# Patient Record
Sex: FEMALE | Race: WHITE | Employment: STUDENT | URBAN - METROPOLITAN AREA
[De-identification: names, ages, dates, MRNs, and addresses within clinical notes are randomized per-mention and may not be internally consistent; named-entity substitution may affect disease eponyms.]

---
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
## 2020-07-10 ENCOUNTER — APPOINTMENT (OUTPATIENT)
Dept: GENERAL RADIOLOGY | Facility: HOSPITAL | Age: 20
End: 2020-07-10
Payer: COMMERCIAL

## 2020-07-10 ENCOUNTER — HOSPITAL ENCOUNTER (EMERGENCY)
Facility: HOSPITAL | Age: 20
Discharge: LEFT BEFORE DISCHARGE | End: 2020-07-10
Payer: COMMERCIAL

## 2020-07-10 ENCOUNTER — APPOINTMENT (OUTPATIENT)
Dept: CT IMAGING | Facility: HOSPITAL | Age: 20
End: 2020-07-10
Attending: EMERGENCY MEDICINE
Payer: COMMERCIAL

## 2020-07-10 VITALS
WEIGHT: 123.44 LBS | OXYGEN SATURATION: 98 % | HEART RATE: 86 BPM | DIASTOLIC BLOOD PRESSURE: 68 MMHG | BODY MASS INDEX: 21 KG/M2 | SYSTOLIC BLOOD PRESSURE: 111 MMHG | RESPIRATION RATE: 18 BRPM | TEMPERATURE: 98 F

## 2020-07-10 DIAGNOSIS — N83.201 CYST OF RIGHT OVARY: Primary | ICD-10-CM

## 2020-07-10 DIAGNOSIS — R10.9 ABDOMINAL PAIN OF UNKNOWN ETIOLOGY: ICD-10-CM

## 2020-07-10 LAB
ALBUMIN SERPL-MCNC: 4.1 G/DL (ref 3.4–5)
ALBUMIN/GLOB SERPL: 0.9 {RATIO} (ref 1–2)
ALP LIVER SERPL-CCNC: 98 U/L (ref 52–144)
ALT SERPL-CCNC: 18 U/L (ref 13–56)
ANION GAP SERPL CALC-SCNC: 4 MMOL/L (ref 0–18)
AST SERPL-CCNC: 15 U/L (ref 15–37)
BASOPHILS # BLD AUTO: 0.04 X10(3) UL (ref 0–0.2)
BASOPHILS NFR BLD AUTO: 0.4 %
BILIRUB SERPL-MCNC: 0.6 MG/DL (ref 0.1–2)
BUN BLD-MCNC: 9 MG/DL (ref 7–18)
BUN/CREAT SERPL: 10.3 (ref 10–20)
CALCIUM BLD-MCNC: 9.3 MG/DL (ref 8.5–10.1)
CHLORIDE SERPL-SCNC: 105 MMOL/L (ref 98–112)
CO2 SERPL-SCNC: 29 MMOL/L (ref 21–32)
CREAT BLD-MCNC: 0.87 MG/DL (ref 0.55–1.02)
DEPRECATED RDW RBC AUTO: 47.4 FL (ref 35.1–46.3)
EOSINOPHIL # BLD AUTO: 0.15 X10(3) UL (ref 0–0.7)
EOSINOPHIL NFR BLD AUTO: 1.5 %
ERYTHROCYTE [DISTWIDTH] IN BLOOD BY AUTOMATED COUNT: 13.9 % (ref 11–15)
GLOBULIN PLAS-MCNC: 4.5 G/DL (ref 2.8–4.4)
GLUCOSE BLD-MCNC: 71 MG/DL (ref 70–99)
HCT VFR BLD AUTO: 43.3 % (ref 35–48)
HGB BLD-MCNC: 14.5 G/DL (ref 12–16)
IMM GRANULOCYTES # BLD AUTO: 0.03 X10(3) UL (ref 0–1)
IMM GRANULOCYTES NFR BLD: 0.3 %
LIPASE SERPL-CCNC: 93 U/L (ref 73–393)
LYMPHOCYTES # BLD AUTO: 2.21 X10(3) UL (ref 1–4)
LYMPHOCYTES NFR BLD AUTO: 22.7 %
M PROTEIN MFR SERPL ELPH: 8.6 G/DL (ref 6.4–8.2)
MCH RBC QN AUTO: 31.3 PG (ref 26–34)
MCHC RBC AUTO-ENTMCNC: 33.5 G/DL (ref 31–37)
MCV RBC AUTO: 93.5 FL (ref 80–100)
MONOCYTES # BLD AUTO: 0.93 X10(3) UL (ref 0.1–1)
MONOCYTES NFR BLD AUTO: 9.6 %
NEUTROPHILS # BLD AUTO: 6.37 X10 (3) UL (ref 1.5–7.7)
NEUTROPHILS # BLD AUTO: 6.37 X10(3) UL (ref 1.5–7.7)
NEUTROPHILS NFR BLD AUTO: 65.5 %
OSMOLALITY SERPL CALC.SUM OF ELEC: 283 MOSM/KG (ref 275–295)
PLATELET # BLD AUTO: 309 10(3)UL (ref 150–450)
POTASSIUM SERPL-SCNC: 4.2 MMOL/L (ref 3.5–5.1)
RBC # BLD AUTO: 4.63 X10(6)UL (ref 3.8–5.3)
SODIUM SERPL-SCNC: 138 MMOL/L (ref 136–145)
WBC # BLD AUTO: 9.7 X10(3) UL (ref 4–11)

## 2020-07-10 PROCEDURE — 99284 EMERGENCY DEPT VISIT MOD MDM: CPT

## 2020-07-10 PROCEDURE — 36415 COLL VENOUS BLD VENIPUNCTURE: CPT

## 2020-07-10 PROCEDURE — 73030 X-RAY EXAM OF SHOULDER: CPT

## 2020-07-10 PROCEDURE — 85025 COMPLETE CBC W/AUTO DIFF WBC: CPT | Performed by: EMERGENCY MEDICINE

## 2020-07-10 PROCEDURE — 80053 COMPREHEN METABOLIC PANEL: CPT | Performed by: EMERGENCY MEDICINE

## 2020-07-10 PROCEDURE — 71046 X-RAY EXAM CHEST 2 VIEWS: CPT

## 2020-07-10 PROCEDURE — 83690 ASSAY OF LIPASE: CPT | Performed by: EMERGENCY MEDICINE

## 2020-07-10 PROCEDURE — 74177 CT ABD & PELVIS W/CONTRAST: CPT | Performed by: EMERGENCY MEDICINE

## 2020-07-10 NOTE — ED NOTES
Pt refusing ultrasound and states she is unable to provide a urine sample. Dr. Melida Garcia notified.

## 2020-07-10 NOTE — ED INITIAL ASSESSMENT (HPI)
C/o right shoulder pain since yesterday. Pt states she also noticed \"lumps\" under her right breast \"earlier this week\".  Pt states unknown falls or trauma but says she also noticed a scab on her right elbow and is \"unsure how that got there but maybe i

## 2020-07-12 NOTE — ED PROVIDER NOTES
Patient Seen in: [facility] AND [facility] Emergency Department      History   Patient presents with:  Upper Extremity Injury  Abdomen/Flank Pain    Stated Complaint: Rt arm pain    HPI    21year old female with h/o allergies, h/o self mutilation cutting to all Pulse 110   Resp 20   Temp 98.2 °F (36.8 °C)   Temp src    SpO2 98 %   O2 Device None (Room air)       Current:/68   Pulse 86   Temp 98.2 °F (36.8 °C)   Resp 18   Wt 56 kg   LMP  (Within Weeks)   SpO2 98%   BMI 20.54 kg/m²         Physical Exam  Yoly Total Protein 8.6 (*)     Globulin  4.5 (*)     A/G Ratio 0.9 (*)     All other components within normal limits   CBC W/ DIFFERENTIAL - Abnormal; Notable for the following components:    RDW-SD 47.4 (*)     All other components within normal limits   LIPA CONCLUSION:  1. Development of a mildly complex 4.8 cm right adnexal cyst, which most likely represents a physiologic right ovarian cyst.  If the patient demonstrates symptoms referable to the right adnexa, dedicated pelvic ultrasound could be considered. Discharge Medication List as of 7/10/2020  5:09 PM

## 2020-08-03 ENCOUNTER — TELEPHONE (OUTPATIENT)
Dept: PEDIATRICS CLINIC | Facility: CLINIC | Age: 20
End: 2020-08-03

## 2020-08-03 NOTE — TELEPHONE ENCOUNTER
Paperwork received for Donation of Plasmapheresis Program. Adilene Batres unable to complete as last [facility] was in 2017 and patient aged out of Pediatrics. Patient to call back for PCP recommendations if needed.

## 2020-08-27 ENCOUNTER — HOSPITAL ENCOUNTER (EMERGENCY)
Facility: HOSPITAL | Age: 20
Discharge: HOME | End: 2020-08-27
Attending: EMERGENCY MEDICINE
Payer: COMMERCIAL

## 2020-08-27 VITALS
WEIGHT: 130 LBS | HEIGHT: 65 IN | TEMPERATURE: 100 F | RESPIRATION RATE: 20 BRPM | HEART RATE: 90 BPM | DIASTOLIC BLOOD PRESSURE: 82 MMHG | SYSTOLIC BLOOD PRESSURE: 132 MMHG | OXYGEN SATURATION: 96 % | BODY MASS INDEX: 21.66 KG/M2

## 2020-08-27 DIAGNOSIS — B00.9 HSV-2 (HERPES SIMPLEX VIRUS 2) INFECTION: Primary | ICD-10-CM

## 2020-08-27 PROCEDURE — 99284 EMERGENCY DEPT VISIT MOD MDM: CPT

## 2020-08-27 PROCEDURE — 96372 THER/PROPH/DIAG INJ SC/IM: CPT

## 2020-08-27 PROCEDURE — 87591 N.GONORRHOEAE DNA AMP PROB: CPT | Performed by: EMERGENCY MEDICINE

## 2020-08-27 PROCEDURE — 87205 SMEAR GRAM STAIN: CPT | Performed by: EMERGENCY MEDICINE

## 2020-08-27 PROCEDURE — 87491 CHLMYD TRACH DNA AMP PROBE: CPT | Performed by: EMERGENCY MEDICINE

## 2020-08-27 PROCEDURE — 87106 FUNGI IDENTIFICATION YEAST: CPT | Performed by: EMERGENCY MEDICINE

## 2020-08-27 PROCEDURE — 87808 TRICHOMONAS ASSAY W/OPTIC: CPT | Performed by: EMERGENCY MEDICINE

## 2020-08-27 RX ORDER — IBUPROFEN 600 MG/1
600 TABLET ORAL ONCE
Status: COMPLETED | OUTPATIENT
Start: 2020-08-27 | End: 2020-08-27

## 2020-08-27 RX ORDER — AZITHROMYCIN 250 MG/1
1000 TABLET, FILM COATED ORAL ONCE
Status: COMPLETED | OUTPATIENT
Start: 2020-08-27 | End: 2020-08-27

## 2020-08-27 RX ORDER — ACETAMINOPHEN 500 MG
1000 TABLET ORAL ONCE
Status: COMPLETED | OUTPATIENT
Start: 2020-08-27 | End: 2020-08-27

## 2020-08-27 RX ORDER — VALACYCLOVIR HYDROCHLORIDE 1 G/1
1 TABLET, FILM COATED ORAL EVERY 12 HOURS
Qty: 20 TABLET | Refills: 0 | Status: SHIPPED | OUTPATIENT
Start: 2020-08-27 | End: 2020-09-06

## 2020-08-28 LAB
C TRACH DNA SPEC QL NAA+PROBE: POSITIVE
N GONORRHOEA DNA SPEC QL NAA+PROBE: POSITIVE
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
## 2020-08-30 LAB
GENITAL VAGINOSIS SCREEN: POSITIVE
TRICHOMONAS SCREEN: NEGATIVE

## 2021-09-23 ENCOUNTER — APPOINTMENT (OUTPATIENT)
Dept: GENERAL RADIOLOGY | Facility: HOSPITAL | Age: 21
End: 2021-09-23
Attending: EMERGENCY MEDICINE
Payer: COMMERCIAL

## 2021-09-23 PROCEDURE — 73130 X-RAY EXAM OF HAND: CPT | Performed by: EMERGENCY MEDICINE

## 2021-09-23 NOTE — ED QUICK NOTES
Dong Daigle notified of resources needed for patient for outpatient detox. List of resources obtained to go over with patient.

## 2021-09-23 NOTE — ED QUICK NOTES
Pt A&OX4, pt's mother in [place]. Discharge paperwork, prescription, follow-up and resources thoroughly with patient, patient verbally understands them. Pt discharged ambulatory with steady gait - no distress noted.

## 2021-09-23 NOTE — ED INITIAL ASSESSMENT (HPI)
Requesting detox for heroin. Reports last using 1 day ago. Reports using x3 months, +snorting and injecting. Patient also concerned for UTI and herpes.

## 2021-09-23 NOTE — ED PROVIDER NOTES
Patient Seen in: [facility] AND [facility] Emergency Department      History   Patient presents with:  Eval-D    Stated Complaint: eval D; heroin last used yesterday    Subjective:   HPI    24year old female who has a history of self-mutilation, headaches, IV h 23.30 kg/m²         Physical Exam  Vitals and nursing note reviewed. Constitutional:       General: She is not in acute distress. Appearance: She is well-developed. She is not toxic-appearing or diaphoretic.       Comments: Appears to feel unwell, c/o WBC Urine 11-20 (*)     RBC Urine >10 (*)     Bacteria Urine 1+ (*)     Squamous Epi.  Cells Few (*)     All other components within normal limits   URINE CULTURE, ROUTINE       MDM      Pulse Ox: 100%, Normal, RA    Cardiac Monitor: Pulse Readings from [employer] follow-up          Medications Prescribed:  Current Discharge Medication List    START taking these medications    buprenorphine HCl 2 MG Sublingual SL Tab  Place 2 tablets (4 mg total) under the tongue daily for 3 days.   Qty: 6 tablet Refills: 0    cephal

## 2021-10-20 ENCOUNTER — HOSPITAL ENCOUNTER (EMERGENCY)
Facility: HOSPITAL | Age: 21
Discharge: HOME | End: 2021-10-20
Attending: EMERGENCY MEDICINE
Payer: COMMERCIAL

## 2021-10-20 ENCOUNTER — APPOINTMENT (OUTPATIENT)
Dept: ULTRASOUND IMAGING | Facility: HOSPITAL | Age: 21
End: 2021-10-20
Attending: EMERGENCY MEDICINE
Payer: COMMERCIAL

## 2021-10-20 VITALS
HEART RATE: 74 BPM | DIASTOLIC BLOOD PRESSURE: 70 MMHG | HEIGHT: 65 IN | OXYGEN SATURATION: 100 % | BODY MASS INDEX: 21.66 KG/M2 | SYSTOLIC BLOOD PRESSURE: 124 MMHG | RESPIRATION RATE: 18 BRPM | TEMPERATURE: 98 F | WEIGHT: 130 LBS

## 2021-10-20 DIAGNOSIS — R82.71 BACTERIURIA WITH PYURIA: ICD-10-CM

## 2021-10-20 DIAGNOSIS — Z87.898 HISTORY OF HEROIN USE: Primary | ICD-10-CM

## 2021-10-20 DIAGNOSIS — R19.7 NAUSEA VOMITING AND DIARRHEA: ICD-10-CM

## 2021-10-20 DIAGNOSIS — R10.84 GENERALIZED ABDOMINAL CRAMPING: ICD-10-CM

## 2021-10-20 DIAGNOSIS — R11.2 NAUSEA VOMITING AND DIARRHEA: ICD-10-CM

## 2021-10-20 DIAGNOSIS — R82.81 BACTERIURIA WITH PYURIA: ICD-10-CM

## 2021-10-20 PROCEDURE — 96361 HYDRATE IV INFUSION ADD-ON: CPT

## 2021-10-20 PROCEDURE — 99285 EMERGENCY DEPT VISIT HI MDM: CPT

## 2021-10-20 PROCEDURE — 96372 THER/PROPH/DIAG INJ SC/IM: CPT

## 2021-10-20 PROCEDURE — 83690 ASSAY OF LIPASE: CPT | Performed by: EMERGENCY MEDICINE

## 2021-10-20 PROCEDURE — 80048 BASIC METABOLIC PNL TOTAL CA: CPT | Performed by: EMERGENCY MEDICINE

## 2021-10-20 PROCEDURE — 96375 TX/PRO/DX INJ NEW DRUG ADDON: CPT

## 2021-10-20 PROCEDURE — 81025 URINE PREGNANCY TEST: CPT

## 2021-10-20 PROCEDURE — 81001 URINALYSIS AUTO W/SCOPE: CPT | Performed by: EMERGENCY MEDICINE

## 2021-10-20 PROCEDURE — 80048 BASIC METABOLIC PNL TOTAL CA: CPT

## 2021-10-20 PROCEDURE — 76705 ECHO EXAM OF ABDOMEN: CPT | Performed by: EMERGENCY MEDICINE

## 2021-10-20 PROCEDURE — 85025 COMPLETE CBC W/AUTO DIFF WBC: CPT

## 2021-10-20 PROCEDURE — 96374 THER/PROPH/DIAG INJ IV PUSH: CPT

## 2021-10-20 PROCEDURE — 87086 URINE CULTURE/COLONY COUNT: CPT | Performed by: EMERGENCY MEDICINE

## 2021-10-20 PROCEDURE — 80076 HEPATIC FUNCTION PANEL: CPT | Performed by: EMERGENCY MEDICINE

## 2021-10-20 PROCEDURE — 85025 COMPLETE CBC W/AUTO DIFF WBC: CPT | Performed by: EMERGENCY MEDICINE

## 2021-10-20 PROCEDURE — 84703 CHORIONIC GONADOTROPIN ASSAY: CPT | Performed by: EMERGENCY MEDICINE

## 2021-10-20 RX ORDER — DICYCLOMINE HYDROCHLORIDE 10 MG/ML
20 INJECTION INTRAMUSCULAR ONCE
Status: COMPLETED | OUTPATIENT
Start: 2021-10-20 | End: 2021-10-20

## 2021-10-20 RX ORDER — METOCLOPRAMIDE HYDROCHLORIDE 5 MG/ML
10 INJECTION INTRAMUSCULAR; INTRAVENOUS ONCE
Status: COMPLETED | OUTPATIENT
Start: 2021-10-20 | End: 2021-10-20

## 2021-10-20 RX ORDER — ONDANSETRON 4 MG/1
4 TABLET, ORALLY DISINTEGRATING ORAL EVERY 8 HOURS PRN
Qty: 15 TABLET | Refills: 0 | Status: SHIPPED | OUTPATIENT
Start: 2021-10-20 | End: 2021-10-25

## 2021-10-20 RX ORDER — NITROFURANTOIN 25; 75 MG/1; MG/1
100 CAPSULE ORAL 2 TIMES DAILY
Qty: 10 CAPSULE | Refills: 0 | Status: SHIPPED | OUTPATIENT
Start: 2021-10-20 | End: 2021-10-25

## 2021-10-20 RX ORDER — DICYCLOMINE HCL 20 MG
20 TABLET ORAL 4 TIMES DAILY PRN
Qty: 40 TABLET | Refills: 0 | Status: SHIPPED | OUTPATIENT
Start: 2021-10-20 | End: 2021-10-30

## 2021-10-20 RX ORDER — ONDANSETRON 2 MG/ML
4 INJECTION INTRAMUSCULAR; INTRAVENOUS ONCE
Status: COMPLETED | OUTPATIENT
Start: 2021-10-20 | End: 2021-10-20

## 2021-10-20 RX ORDER — METOCLOPRAMIDE 10 MG/1
10 TABLET ORAL EVERY 6 HOURS PRN
Qty: 20 TABLET | Refills: 0 | Status: SHIPPED | OUTPATIENT
Start: 2021-10-20 | End: 2021-10-25

## 2021-10-20 RX ORDER — DIPHENHYDRAMINE HYDROCHLORIDE 50 MG/ML
25 INJECTION INTRAMUSCULAR; INTRAVENOUS ONCE
Status: COMPLETED | OUTPATIENT
Start: 2021-10-20 | End: 2021-10-20

## 2021-10-20 NOTE — ED PROVIDER NOTES
Patient Seen in: [facility] AND [facility] Emergency Department    History   Patient presents with:  Abdomen/Flank Pain  Nausea/Vomiting/Diarrhea  Fever    Stated Complaint: vomiting/fever      HPI    63-year-old female without past medical history aside from IV h Positive for stated complaint: vomiting/fever   Other systems are as noted in HPI. Constitutional and vital signs reviewed. All other systems reviewed and negative except as noted above.     PSFH elements reviewed from today and agreed except as o Differential With Platelet.   Procedure                               Abnormality         Status                     ---------                               -----------         ------                     CBC W/ DIFFERENTIAL[[id number]] given prior cholecystectomy.     DICTATED BY (CST): Janet Sanchez MD ON 10/20/2021 AT 9:00 PM     FINALIZED BY (CST): Janet Sanchez MD ON 10/20/2021 AT 9:01 PM                    MDM     DIFFERENTIAL DIAGNOSIS: After history and physical exam differ Medication List as of 10/20/2021 10:50 PM    START taking these medications    dicyclomine 20 MG Oral Tab  Take 1 tablet (20 mg total) by mouth 4 (four) times daily as needed (For abdominal pain/cramping and/or diarrhea. )., Print, Disp-40 tablet, R-0    me

## 2021-10-20 NOTE — ED INITIAL ASSESSMENT (HPI)
Pt c/o cramping lower abd pain, fever, vomiting for the past few days. Pt also c/o heroin withdrawal last used 2 days ago.
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
## 2021-11-08 PROBLEM — F11.20 OPIOID USE DISORDER, SEVERE, DEPENDENCE (HCC): Status: ACTIVE | Noted: 2021-11-08

## 2021-11-08 NOTE — ED QUICK NOTES
ROUNDING COMPLETED  PAIN: PATIENT DENIES PAIN AT THIS TIME  WAITING: DETOX PLACEMENT  ELIMINATION: BRP OFFERED  NEEDS: NO ADDITIONAL NEEDS AT THIS TIME    BED LOCKED AND IN LOWEST POSITION. SITTER IN LINE OF SIGHT. WILL CONTINUE TO MONITOR.  WILL UPDATE P

## 2021-11-08 NOTE — PROGRESS NOTES
11/08/21 0910   COVID Exposure Risk Screening   Have you been practicing social distancing? Yes   Have you been wearing a mask when in the community?  No   Describe pt does not generally go out into the community, but is not wearing a mask (only leaving

## 2021-11-08 NOTE — ED INITIAL ASSESSMENT (HPI)
Patient denies suicidal ideation/plan but reports \"not minding if she \" because of the severe generalized withdrawal pain

## 2021-11-08 NOTE — ED PROVIDER NOTES
Patient Seen in: [facility] AND [facility] Emergency Department    History   Patient presents with:  [place]    Stated Complaint:     HPI    Patient presents to the emergency department again because of her heroin abuse history.   She states she both snorts and in External Gel,  Use qhs for acne back chest face         Review of Systems  Constitutional: no fever    Positive for stated complaint:   Other systems are as noted in HPI. Constitutional and vital signs reviewed.       All other systems reviewed and negativ this point. With the female nurse in the room with me I did examine the external area there is a partially healing lesion on the right lower labia on the right there is no vesicle there is no surrounding infection noted.   I discussed with her at this poin assay on the [facility], [facility], [address] [place] [address]. This test is being used under the Food and Drug Administration's Emergency Use Authorization.     The authorized Fact Sheet for Healthcare Providers for this assay is available upon request

## 2021-11-08 NOTE — ED QUICK NOTES
Ed crisis worker at bedside to evaluate patient, patient found to be vomiting.  Will medicate per mar
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
## 2021-11-08 NOTE — ED INITIAL ASSESSMENT (HPI)
Patient arrives through triage for heroin withdrawal.  +decreased appetite.  +constipation.  +generalized body aches. +abdominal cramping. Patient reports using \"bumps\" of heroin to decrease withdrawal symptoms yesterday.

## 2021-11-08 NOTE — ED QUICK NOTES
rn rounded on patent. Patient denies pain at this time.   [place] provided per patient request  Food offered to patient who declined at this time, rn informed patient to let rn know if patient wants food  Awaiting detox placement  Will continue to monitor

## 2021-11-08 NOTE — CONSULTS
[facility] HOSP - [facility]    Report of Consultation    Elena Crabtree Patient Status:  Emergency    2000 MRN [id number]   Location [address] Attending Vivien Cisneros MD   Hosp Day # 0 PCP Sreedhar Brock MD and mother to pay for substance use. She states that her mother did not know the money was being used for this purpose. Patient states that she feels paranoia and hears voices all the time.  She says that the voices are exacerbated whenever she uses meth rhinitis    • HEADACHES    • Heroin abuse ([facility] [address]    • History of self mutilation     Cutting, per pt (between 12-14 yrs of age)   • Multiple allergies        Past Surgical History  Past Surgical History:   Procedure Laterality Date   • LAPAROSCOPIC CHOLECYS contact. Psychomotor: constant leg bouncing  Orientation: Alert and oriented x 3  Gait: not assessed  Attitude/Cooperation: Guarded at first, opened up later. Cooperative with questions.   Behavior: Poor eye contact, flattened affect, occasionally tearful patch 0.3 for 1 week. 5.  Utilize Haldol 2 mg orally twice daily to address her hallucination, nausea and intrusive thoughts. 6.  Utilize Ativan 1 mg orally every 6 hours as needed for anxiety but  7.   Discussed the possible admission to [facility] p

## 2021-11-08 NOTE — ED QUICK NOTES
Crisis worker informed rn needing nurse to nurse, called x [phone], reported will call this rn back

## 2022-01-16 ENCOUNTER — HOSPITAL ENCOUNTER (OUTPATIENT)
Age: 22
Discharge: HOME | End: 2022-01-16
Payer: COMMERCIAL

## 2022-01-16 VITALS
BODY MASS INDEX: 21.66 KG/M2 | HEART RATE: 112 BPM | HEIGHT: 65 IN | DIASTOLIC BLOOD PRESSURE: 73 MMHG | TEMPERATURE: 100 F | OXYGEN SATURATION: 97 % | RESPIRATION RATE: 20 BRPM | SYSTOLIC BLOOD PRESSURE: 112 MMHG | WEIGHT: 130 LBS

## 2022-01-16 DIAGNOSIS — R50.9 FEVER, UNSPECIFIED FEVER CAUSE: Primary | ICD-10-CM

## 2022-01-16 DIAGNOSIS — B34.9 VIRAL ILLNESS: ICD-10-CM

## 2022-01-16 PROCEDURE — 99213 OFFICE O/P EST LOW 20 MIN: CPT | Performed by: NURSE PRACTITIONER

## 2022-01-16 PROCEDURE — A9150 MISC/EXPER NON-PRESCRIPT DRU: HCPCS | Performed by: NURSE PRACTITIONER

## 2022-01-16 RX ORDER — ACETAMINOPHEN 500 MG
1000 TABLET ORAL EVERY 4 HOURS PRN
Qty: 100 TABLET | Refills: 0 | Status: SHIPPED | OUTPATIENT
Start: 2022-01-16 | End: 2022-01-23

## 2022-01-16 RX ORDER — IBUPROFEN 400 MG/1
800 TABLET ORAL ONCE
Status: COMPLETED | OUTPATIENT
Start: 2022-01-16 | End: 2022-01-16

## 2022-01-16 RX ORDER — VALACYCLOVIR HYDROCHLORIDE 500 MG/1
1000 TABLET, FILM COATED ORAL DAILY
Qty: 10 TABLET | Refills: 0 | Status: SHIPPED | OUTPATIENT
Start: 2022-01-16 | End: 2022-01-21

## 2022-01-16 RX ORDER — IBUPROFEN 600 MG/1
600 TABLET ORAL EVERY 8 HOURS PRN
Qty: 30 TABLET | Refills: 0 | Status: SHIPPED | OUTPATIENT
Start: 2022-01-16 | End: 2022-01-23

## 2022-01-16 RX ORDER — ACETAMINOPHEN 500 MG
1000 TABLET ORAL ONCE
Status: COMPLETED | OUTPATIENT
Start: 2022-01-16 | End: 2022-01-16

## 2022-01-16 NOTE — ED PROVIDER NOTES
Patient Seen in: [facility]      History   Patient presents with: Body ache and/or chills    Stated Complaint: chest pain, sob    Subjective:   HPI    1 week of chest and body pain with fever and cough.   Completely reproducible pain with pal All other systems reviewed and are negative. Positive for stated complaint: chest pain, sob  Other systems are as noted in HPI. Constitutional and vital signs reviewed. All other systems reviewed and negative except as noted above.     Physical Pulse: 112   Resp: 20   Temp: 99.5 °F (37.5 °C)          01/16/22  1310   BP:    Pulse: 112   Resp: 20   Temp: 99.5 °F (37.5 °C)     Pt feels improved  covid test pending     Serial exams performed, no new or worsening concerns, in fact the pt feels impr in medical language and may contain abbreviations or verbiage that are unfamiliar. It may appear blunt or direct.   Medical documents are intended to carry relevant information, facts as evident, and the clinical opinion of the practitioner\"

## 2022-01-16 NOTE — ED INITIAL ASSESSMENT (HPI)
Pt came in due to body aches and fever for the past week. Pt stated she has an outbreak of herpes at the moment and would like a prescription. Pt has easy non labored respirations. Pt is fully verbal and ambulatory.

## 2022-01-17 ENCOUNTER — APPOINTMENT (OUTPATIENT)
Dept: GENERAL RADIOLOGY | Facility: HOSPITAL | Age: 22
End: 2022-01-17
Attending: EMERGENCY MEDICINE
Payer: COMMERCIAL

## 2022-01-17 ENCOUNTER — HOSPITAL ENCOUNTER (EMERGENCY)
Facility: HOSPITAL | Age: 22
Discharge: HOME | End: 2022-01-18
Attending: EMERGENCY MEDICINE
Payer: COMMERCIAL

## 2022-01-17 DIAGNOSIS — Z20.822 SUSPECTED COVID-19 VIRUS INFECTION: ICD-10-CM

## 2022-01-17 DIAGNOSIS — R11.2 NAUSEA AND VOMITING IN ADULT: ICD-10-CM

## 2022-01-17 DIAGNOSIS — R07.89 CHEST PAIN, ATYPICAL: Primary | ICD-10-CM

## 2022-01-17 LAB
ANION GAP SERPL CALC-SCNC: 7 MMOL/L (ref 0–18)
BASOPHILS # BLD AUTO: 0.06 X10(3) UL (ref 0–0.2)
BASOPHILS NFR BLD AUTO: 0.4 %
BUN BLD-MCNC: 7 MG/DL (ref 7–18)
BUN/CREAT SERPL: 13 (ref 10–20)
CALCIUM BLD-MCNC: 9.7 MG/DL (ref 8.5–10.1)
CHLORIDE SERPL-SCNC: 102 MMOL/L (ref 98–112)
CO2 SERPL-SCNC: 24 MMOL/L (ref 21–32)
CREAT BLD-MCNC: 0.54 MG/DL
D DIMER PPP FEU-MCNC: 2.53 UG/ML FEU (ref ?–0.5)
DEPRECATED RDW RBC AUTO: 43.1 FL (ref 35.1–46.3)
EOSINOPHIL # BLD AUTO: 0.04 X10(3) UL (ref 0–0.7)
EOSINOPHIL NFR BLD AUTO: 0.3 %
ERYTHROCYTE [DISTWIDTH] IN BLOOD BY AUTOMATED COUNT: 13.3 % (ref 11–15)
GLUCOSE BLD-MCNC: 86 MG/DL (ref 70–99)
HCT VFR BLD AUTO: 41 %
HGB BLD-MCNC: 13.5 G/DL
IMM GRANULOCYTES # BLD AUTO: 0.08 X10(3) UL (ref 0–1)
IMM GRANULOCYTES NFR BLD: 0.5 %
LYMPHOCYTES # BLD AUTO: 2.65 X10(3) UL (ref 1–4)
LYMPHOCYTES NFR BLD AUTO: 18.2 %
MCH RBC QN AUTO: 29 PG (ref 26–34)
MCHC RBC AUTO-ENTMCNC: 32.9 G/DL (ref 31–37)
MCV RBC AUTO: 88.2 FL
MONOCYTES # BLD AUTO: 1.08 X10(3) UL (ref 0.1–1)
MONOCYTES NFR BLD AUTO: 7.4 %
NEUTROPHILS # BLD AUTO: 10.65 X10 (3) UL (ref 1.5–7.7)
NEUTROPHILS # BLD AUTO: 10.65 X10(3) UL (ref 1.5–7.7)
NEUTROPHILS NFR BLD AUTO: 73.2 %
OSMOLALITY SERPL CALC.SUM OF ELEC: 273 MOSM/KG (ref 275–295)
PLATELET # BLD AUTO: 337 10(3)UL (ref 150–450)
POTASSIUM SERPL-SCNC: 4.1 MMOL/L (ref 3.5–5.1)
RBC # BLD AUTO: 4.65 X10(6)UL
SODIUM SERPL-SCNC: 133 MMOL/L (ref 136–145)
TROPONIN I HIGH SENSITIVITY: <3 NG/L
WBC # BLD AUTO: 14.6 X10(3) UL (ref 4–11)

## 2022-01-17 PROCEDURE — 71045 X-RAY EXAM CHEST 1 VIEW: CPT | Performed by: EMERGENCY MEDICINE

## 2022-01-17 PROCEDURE — 80048 BASIC METABOLIC PNL TOTAL CA: CPT | Performed by: EMERGENCY MEDICINE

## 2022-01-17 PROCEDURE — 99285 EMERGENCY DEPT VISIT HI MDM: CPT

## 2022-01-17 PROCEDURE — 96374 THER/PROPH/DIAG INJ IV PUSH: CPT

## 2022-01-17 PROCEDURE — 85025 COMPLETE CBC W/AUTO DIFF WBC: CPT | Performed by: EMERGENCY MEDICINE

## 2022-01-17 PROCEDURE — 85379 FIBRIN DEGRADATION QUANT: CPT | Performed by: EMERGENCY MEDICINE

## 2022-01-17 PROCEDURE — 93005 ELECTROCARDIOGRAM TRACING: CPT

## 2022-01-17 PROCEDURE — 96361 HYDRATE IV INFUSION ADD-ON: CPT

## 2022-01-17 PROCEDURE — 93010 ELECTROCARDIOGRAM REPORT: CPT | Performed by: EMERGENCY MEDICINE

## 2022-01-17 PROCEDURE — 84484 ASSAY OF TROPONIN QUANT: CPT | Performed by: EMERGENCY MEDICINE

## 2022-01-17 RX ORDER — IBUPROFEN 600 MG/1
600 TABLET ORAL ONCE
Status: DISCONTINUED | OUTPATIENT
Start: 2022-01-17 | End: 2022-01-17

## 2022-01-17 RX ORDER — ONDANSETRON 2 MG/ML
4 INJECTION INTRAMUSCULAR; INTRAVENOUS ONCE
Status: COMPLETED | OUTPATIENT
Start: 2022-01-17 | End: 2022-01-17

## 2022-01-17 RX ORDER — ACETAMINOPHEN 500 MG
1000 TABLET ORAL ONCE
Status: COMPLETED | OUTPATIENT
Start: 2022-01-17 | End: 2022-01-17

## 2022-01-17 RX ORDER — KETOROLAC TROMETHAMINE 15 MG/ML
15 INJECTION, SOLUTION INTRAMUSCULAR; INTRAVENOUS ONCE
Status: COMPLETED | OUTPATIENT
Start: 2022-01-17 | End: 2022-01-18

## 2022-01-18 ENCOUNTER — APPOINTMENT (OUTPATIENT)
Dept: CT IMAGING | Facility: HOSPITAL | Age: 22
End: 2022-01-18
Attending: EMERGENCY MEDICINE
Payer: COMMERCIAL

## 2022-01-18 VITALS
OXYGEN SATURATION: 100 % | WEIGHT: 130 LBS | HEIGHT: 65 IN | HEART RATE: 89 BPM | TEMPERATURE: 98 F | BODY MASS INDEX: 21.66 KG/M2 | RESPIRATION RATE: 34 BRPM | DIASTOLIC BLOOD PRESSURE: 61 MMHG | SYSTOLIC BLOOD PRESSURE: 111 MMHG

## 2022-01-18 LAB — B-HCG UR QL: NEGATIVE

## 2022-01-18 PROCEDURE — 71260 CT THORAX DX C+: CPT | Performed by: EMERGENCY MEDICINE

## 2022-01-18 PROCEDURE — 81025 URINE PREGNANCY TEST: CPT

## 2022-01-18 PROCEDURE — 96361 HYDRATE IV INFUSION ADD-ON: CPT

## 2022-01-18 PROCEDURE — 96375 TX/PRO/DX INJ NEW DRUG ADDON: CPT

## 2022-01-18 RX ORDER — ONDANSETRON 4 MG/1
4 TABLET, ORALLY DISINTEGRATING ORAL EVERY 4 HOURS PRN
Qty: 10 TABLET | Refills: 0 | Status: SHIPPED | OUTPATIENT
Start: 2022-01-18 | End: 2022-01-25

## 2022-01-18 NOTE — ED INITIAL ASSESSMENT (HPI)
Patient with fever tmax 103, difficulty breathing, vomiting x1 week. Patient has a pending Covid-19 test. Not vaccinated.

## 2022-01-18 NOTE — ED QUICK NOTES
Care endorsed to [facility]. Patient is resting on stretcher on monitors, zofran given for nausea. Patient remains on monitors. No other needs.

## 2022-01-18 NOTE — ED QUICK NOTES
Patient unable to urinate for pregnancy test.   Will hold off on toradol until pregnancy can be run. MD aware.

## 2022-01-18 NOTE — ED PROVIDER NOTES
Patient Seen in: [facility] AND [facility]      History   Patient presents with:  Fever  Difficulty Breathing    Stated Complaint: Dyspnea, fever, N/V    Subjective:   HPI  60-year-old female presents for evaluation of chest pain, shortness N/V  Other systems are as noted in HPI. Constitutional and vital signs reviewed. All other systems reviewed and negative except as noted above.     Physical Exam     ED Triage Vitals   BP 01/17/22 2135 99/64   Pulse 01/17/22 2135 (!) 133   Resp 01/17/ illness    ED Course     Labs Reviewed   BASIC METABOLIC PANEL (8) - Abnormal; Notable for the following components:       Result Value    Sodium 133 (*)     Creatinine 0.54 (*)     Calculated Osmolality 273 (*)     All other components within normal limit or other viral illness. Have advised supportive care, good hydration, antipyretics as needed and short course of Zofran for ongoing nausea and vomiting. Return precautions given for severe worsening symptoms and she is comfortable with the plan.

## 2022-01-19 LAB — SARS-COV-2 RNA RESP QL NAA+PROBE: NOT DETECTED

## 2022-03-04 NOTE — ED QUICK NOTES
Pt examined by Dr. Vijay Lynn for any hidden syringes on body with RN present. None found. Pt calm and cooperative.

## 2022-03-04 NOTE — ED PROVIDER NOTES
Signout taken with disposition pending crisis evaluation and labs - labs nonacute, crisis evaluating without acute safety concerns identified in patient without current suicidal thoughts; patient to be discharged with outpatient resources as noted.     Recent Results (from the past 24 hour(s))   Basic Metabolic Panel (8)    Collection Time: 03/03/22 10:29 PM   Result Value Ref Range    Glucose 135 (H) 70 - 99 mg/dL    Sodium 139 136 - 145 mmol/L    Potassium 3.8 3.5 - 5.1 mmol/L    Chloride 105 98 - 112 mmol/L    CO2 26.0 21.0 - 32.0 mmol/L    Anion Gap 8 0 - 18 mmol/L    BUN 6 (L) 7 - 18 mg/dL    Creatinine 0.64 0.55 - 1.02 mg/dL    BUN/CREA Ratio 9.4 (L) 10.0 - 20.0    Calcium, Total 9.1 8.5 - 10.1 mg/dL    Calculated Osmolality 288 275 - 295 mOsm/kg    GFR, Non- 128 >=60    GFR, -American 148 >=60   Ethyl Alcohol    Collection Time: 03/03/22 10:29 PM   Result Value Ref Range    Ethyl Alcohol <3 <3 mg/dL   CBC W/ DIFFERENTIAL    Collection Time: 03/03/22 10:29 PM   Result Value Ref Range    WBC 11.8 (H) 4.0 - 11.0 x10(3) uL    RBC 4.47 3.80 - 5.30 x10(6)uL    HGB 13.4 12.0 - 16.0 g/dL    HCT 39.8 35.0 - 48.0 %    MCV 89.0 80.0 - 100.0 fL    MCH 30.0 26.0 - 34.0 pg    MCHC 33.7 31.0 - 37.0 g/dL    RDW-SD 51.8 (H) 35.1 - 46.3 fL    RDW 15.8 (H) 11.0 - 15.0 %    .0 150.0 - 450.0 10(3)uL    Neutrophil Absolute Prelim 10.19 (H) 1.50 - 7.70 x10 (3) uL    Neutrophil Absolute 10.19 (H) 1.50 - 7.70 x10(3) uL    Lymphocyte Absolute 0.95 (L) 1.00 - 4.00 x10(3) uL    Monocyte Absolute 0.54 0.10 - 1.00 x10(3) uL    Eosinophil Absolute 0.01 0.00 - 0.70 x10(3) uL    Basophil Absolute 0.02 0.00 - 0.20 x10(3) uL    Immature Granulocyte Absolute 0.07 0.00 - 1.00 x10(3) uL    Neutrophil % 86.4 %    Lymphocyte % 8.1 %    Monocyte % 4.6 %    Eosinophil % 0.1 %    Basophil % 0.2 %    Immature Granulocyte % 0.6 %   Urinalysis with Culture Reflex    Collection Time: 03/03/22 11:41 PM    Specimen: Urine Result Value Ref Range    Urine Color Yellow Yellow    Clarity Urine Clear Clear    Spec Gravity 1.005 1.001 - 1.030    Glucose Urine Negative Negative mg/dL    Bilirubin Urine Negative Negative    Ketones Urine Negative Negative mg/dL    Blood Urine Negative Negative    pH Urine 7.0 5.0 - 8.0    Protein Urine Negative Negative mg/dL    Urobilinogen Urine <2.0 <2.0    Nitrite Urine Negative Negative    Leukocyte Esterase Urine Negative Negative    Microscopic Microscopic not indicated     Ascorbic Acid Urine Negative Negative mg/dL   Drug Abuse Panel 10 screen    Collection Time: 03/03/22 11:41 PM   Result Value Ref Range    Amphetamine Urine Negative Negative    Barbiturates Urine Negative Negative    Benzodiazepines Urine Negative Negative    Cannabinoid Urine Negative Negative    Cocaine Urine Presumed Positive (A) Negative    Ecstasy Urine Negative Negative    Methadone Urine Negative Negative    Opiate Urine Negative Negative    Oxycodone Urine Negative Negative    PCP Urine Negative Negative    Creatinine Ur Random 38.60 mg/dL   POCT Pregnancy, Urine    Collection Time: 03/03/22 11:43 PM   Result Value Ref Range    POCT Urine Pregnancy Negative Negative

## 2022-03-04 NOTE — ED INITIAL ASSESSMENT (HPI)
Pt received via [facility] after an argument with her parents, pt states that she wanted, \"A ride to [place] to see friends, and they wouldn't give me a ride. They ignore me, it was a joke. \" Pt currently denies SI/HI, expresses that she recently started a job that she wants to go to tomorrow. Per EMS parents saw pt using a syringe, pt states the syringe was filled with suboxone that she obtains from other people who are prescribed.

## 2022-03-04 NOTE — ED NOTES
Late note    After consulting with Dr. Long Giron about information provided by parents, spent time with parents attempting to explain that due to patient's continued daily statements of suicidally, unfortunately today's statements are patient's baseline and there is not indication to legally mandated inpatient psychiatric hospitalization. Expressed that the we are in agreement that patient absolutely is appropriate for inpatient detox and dual diagnosis programming, it is not likely appropriate to restrict patient's self-determination at this time. Patient's behaviors and actions continue to be primarily motivated by substance addiction and she is consistent in denying actual thoughts of suicide. Parents continue to express concerns for her safety. Writer expressed understanding and agreement that if parents do not feel safe it is appropriate to continue to call police. Explained that unfortunately under [place] law psychiatric treatment can be mandated, but addiction treatment cannot. Encouraged family to continue to seek treatment and call the police again for suicidal statements. Father stated that they cannot call the police every day for suicidal statements because it is a misuse of the police resources. Substance abuse resources for dual diagnosis treatment at [facility] and residential treatment levels provided.
Late note    Pt continues to deny SI to nursing staff. Will assess patient further, but consulted with Dr. Amauri Lr before he left the building for the night. Provided information from the family about patient's suicidal statements and gesture to Dr. Amauri Lr. Obtained clarification from family that patient did not make a stabbing gesture, rather took her syringe and put it in her pants and tried to leave. Dr. Amauri Lr does not believe there are legal grounds to detain patient against her will at this time due to the baseline behavior of suicidal statements, no overt action, and no history of suicide attempt or intentional overdose. Recommending dual dx tx.
You are partial weight bearing on right leg with assistive device.   No strenuous activity, heavy lifting, driving or returning to work until cleared by MD.  You may shower - dressing is water-resistant, no soaking in bathtubs.  Remove dressing after post op day 7, then leave incision open to air. Keep incision clean and dry.  Try to have regular bowel movements, take stool softener or laxative if necessary.  May take Pepcid or Zantac for upset stomach.  Swelling may travel all the way down leg to foot, this is normal and will subside in a few weeks.  Call to schedule an appt with Dr. Torres for follow up, if you have staples or sutures they will be removed in office.  Contact your doctor if you experience: fever greater than 101.5, chills, chest pain, difficulty breathing, redness or excessive drainage around the incision, other concerns.    Follow up with your primary care provider.
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
## 2022-04-27 PROBLEM — F25.9 SCHIZOAFFECTIVE DISORDER, CHRONIC CONDITION WITH ACUTE EXACERBATION (HCC): Status: ACTIVE | Noted: 2022-04-27

## 2022-04-27 PROBLEM — F11.93 OPIATE WITHDRAWAL (HCC): Status: ACTIVE | Noted: 2022-04-27

## 2022-04-27 PROBLEM — F11.23 OPIATE WITHDRAWAL (HCC): Status: ACTIVE | Noted: 2022-04-27

## 2022-04-27 PROBLEM — F32.9 MDD (MAJOR DEPRESSIVE DISORDER): Status: ACTIVE | Noted: 2022-04-27

## 2022-04-27 NOTE — ED INITIAL ASSESSMENT (HPI)
Patient presents to ER for heroin detox. Per patient she would like to go to [facility]. Last use yesterday, patient notes she was off for awhile, but did approximately 2 grams yesterday. Patient tearful in triage.

## 2022-04-27 NOTE — ED NOTES
Rights of Individuals, Petition and Certificate were scanned into [employer] and filed with Carol JAMES

## 2022-04-27 NOTE — ED NOTES
Change of Status Form was emailed to [facility] and scanned into [facility]

## 2022-04-27 NOTE — ED QUICK NOTES
Darren Fang is here requesting to be transferred for inpatient heroin detox. Last used yesterday. She states she has been depressed due to difficulty with heroin withdrawal symptoms.   She denies specific plan

## 2022-04-27 NOTE — ED QUICK NOTES
CSSRS questions asked in triage. Patient notes she has had suicidal thoughts, notes that she has attempted to overdose within the last month, but was given narcan promptly. States she does continue to have thoughts to overdose.

## 2022-04-27 NOTE — ED QUICK NOTES
Patient with 1:1 sitter every 15 minute documentation per paper record, suicide precautions in place. Belongings removed, patient wearing hospital safety gown.

## 2022-04-27 NOTE — ED PROVIDER NOTES
Patient endorsed to me by Dr Betsy Sorensen in setting of [place] [facility]. No issues on my shift. Awaiting psychiatric assessment and likely transfer.

## 2022-04-27 NOTE — CERTIFICATION
Ref: [address] [facility] 5/3-403, 5/3-602, 5/3-607, 5/3-610    5/3-702, 5/3-813, 5/4-306, 5/4-402, 5/4-403    5/4-405, 5/4-501, 5/4-611, 0/3-730   Inpatient Certificate  Re: Jonathan Plata    (name)     I personally informed the above-named individual of the purpose of this examination and that he or she did not have to speak to me, and that any statements made might be related in court as to the individual's clinical condition or need for services. Additionally, if this examination was for the purpose of determining that the above-named individual is developmentally disabled and dangerous, I informed the individual of his or her right to speak with a relative, friend or  before the examination, and of his or her right to have an  appointed for him or her if he or she so desired.      Electronically signed by Petty Euceda MD    Signature of Examiner     On 4/27 , 2022 , at  11: 15  [x] a.m. [] p.m.,  I personally examined the    (date)  (year)  (time)    above-named individual. The examination was conducted at [facility] AND [facility].  Based on the foregoing examination, it is my opinion that he or she is:  [x]  A person with mental illness who, because of his or her illness is reasonably expected, unless treated on an inpatient basis, to engage in conduct placing such person or another in physical harm or in reasonable expectation of being physically harmed;   []  A person with mental illness who, because of his or her illness is unable to provide for his or her basic physical needs so as to guard himself or herself from serious harm, without the assistance of family or others, unless treated on an inpatient basis;   []  A person with mental illness who: refuses treatment or is not adhering adequately to prescribed treatment; because of the nature of his or her illness is unable to understand his or her need for treatment; and if not treated on an inpatient basis, is reasonably expected based on his or her behavioral history, to suffer mental or emotional deterioration and is reasonably expected, after such deterioration, to meet the criteria of either paragraph one or paragraph two above;   []  An individual who is developmentally disabled and unless treated on an in-patient basis is reasonably expected to inflict serious physical harm upon himself or herself or others in the near future, and/or   [x]  Is in need of immediate hospitalization for the prevention of such harm. I base my opinion on the following (including clinical observations, factual information):  I examined the patient in person.   The patient with a chronic history of schizoaffective disorder, multiple psychiatric admission and history of suicide additional to severe history of heroin dependence who presented to the hospital with suicidal ideation demonstrating high risk indicating the need for inpatient admission for safety and stabilization  I believe that the individual is subject to: []  Involuntary inpatient admission and is not in need of immediate hospitalization   (check one) [x]  Involuntary inpatient admission and is in need of immediate hospitalization    []  Judicial inpatient admission and is not in need of immediate hospitalization    []  Judicial inpatient admission and is in need of immediate hospitalization     Date: 4/27/2022 Signature: Electronically signed by Suad Jacobs MD   Title: MD Printed Name: Nayely Gill [phone] ([id number]) Inpatient Certificate    Printed by Kno
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
## 2022-04-27 NOTE — ED QUICK NOTES
Patient upset; states she doesn't want to be in this hospital \"this was not the plan, I just want to go home. \"

## 2022-04-28 PROBLEM — F25.9 SCHIZOAFFECTIVE DISORDER, CHRONIC CONDITION WITH ACUTE EXACERBATION (HCC): Status: RESOLVED | Noted: 2022-04-27 | Resolved: 2022-04-28

## 2022-04-28 PROBLEM — F32.9 MDD (MAJOR DEPRESSIVE DISORDER): Status: RESOLVED | Noted: 2022-04-27 | Resolved: 2022-04-28

## 2022-05-05 PROBLEM — F11.20 OPIOID USE DISORDER, SEVERE, DEPENDENCE (HCC): Chronic | Status: ACTIVE | Noted: 2021-11-08

## 2022-05-05 PROBLEM — Z87.898 HISTORY OF SEIZURES: Status: ACTIVE | Noted: 2022-05-05

## 2022-05-05 PROBLEM — F41.1 GENERALIZED ANXIETY DISORDER: Chronic | Status: ACTIVE | Noted: 2022-05-05

## 2022-05-05 PROBLEM — B19.20 HEPATITIS C: Status: ACTIVE | Noted: 2022-05-05

## 2022-05-05 PROBLEM — F39 UNSPECIFIED MOOD (AFFECTIVE) DISORDER (HCC): Chronic | Status: ACTIVE | Noted: 2022-05-05

## 2022-05-05 PROBLEM — F40.10 SOCIAL ANXIETY DISORDER: Chronic | Status: ACTIVE | Noted: 2022-05-05

## 2022-05-05 PROBLEM — R01.1 HEART MURMUR: Status: ACTIVE | Noted: 2022-05-05

## 2022-05-05 PROBLEM — F11.93 OPIATE WITHDRAWAL (HCC): Status: RESOLVED | Noted: 2022-04-27 | Resolved: 2022-05-05

## 2022-05-05 PROBLEM — F15.90 STIMULANT USE DISORDER: Status: ACTIVE | Noted: 2022-05-05

## 2022-11-09 ENCOUNTER — APPOINTMENT (OUTPATIENT)
Dept: GENERAL RADIOLOGY | Facility: HOSPITAL | Age: 22
End: 2022-11-09
Payer: COMMERCIAL

## 2022-11-09 ENCOUNTER — HOSPITAL ENCOUNTER (EMERGENCY)
Facility: HOSPITAL | Age: 22
Discharge: HOME | End: 2022-11-09
Payer: COMMERCIAL

## 2022-11-09 VITALS
DIASTOLIC BLOOD PRESSURE: 68 MMHG | SYSTOLIC BLOOD PRESSURE: 115 MMHG | HEART RATE: 89 BPM | TEMPERATURE: 98 F | OXYGEN SATURATION: 100 % | WEIGHT: 130 LBS | HEIGHT: 65 IN | RESPIRATION RATE: 18 BRPM | BODY MASS INDEX: 21.66 KG/M2

## 2022-11-09 DIAGNOSIS — S93.402A MODERATE LEFT ANKLE SPRAIN, INITIAL ENCOUNTER: Primary | ICD-10-CM

## 2022-11-09 PROCEDURE — 73610 X-RAY EXAM OF ANKLE: CPT

## 2022-11-09 PROCEDURE — 99284 EMERGENCY DEPT VISIT MOD MDM: CPT

## 2022-11-09 PROCEDURE — 73130 X-RAY EXAM OF HAND: CPT

## 2022-11-09 PROCEDURE — 99283 EMERGENCY DEPT VISIT LOW MDM: CPT

## 2022-11-09 RX ORDER — IBUPROFEN 600 MG/1
600 TABLET ORAL EVERY 8 HOURS PRN
Qty: 15 TABLET | Refills: 0 | Status: SHIPPED | OUTPATIENT
Start: 2022-11-09 | End: 2022-11-16

## 2022-11-09 NOTE — ED INITIAL ASSESSMENT (HPI)
Pt presents to the ER on Crutches. Pt c/o left ankle pain since yesterday. Stated she \"attempted to crack her left ankle\" and ever since she has had pain to. The pain has caused her fall various times since.

## 2022-12-05 NOTE — ED PROVIDER NOTES
Patient Seen in: [facility] AND [facility] Emergency Department      History   Patient presents with:  Adonay    Stated Complaint: vaginal pain    HPI    Patient is a 25-year-old female who states she lives with her parents and siblings and arrives by herself in trauma  : +vesicles on outer vagina. Tender to palpation. +yellow vaginal discharge. No obvious lacerations or cuts    ED Course     Labs Reviewed   GENITAL VAGINOSIS SCREEN   CHLAMYDIA/GONOCOCCUS, KRISTIN         MDM     Swabs obtained.   Pt declines police I will SWITCH the dose or number of times a day I take the medications listed below when I get home from the hospital:  None

## 2022-12-15 PROBLEM — Z79.891 CHRONIC PRESCRIPTION OPIATE USE: Status: ACTIVE | Noted: 2022-12-15

## 2023-03-08 PROBLEM — F33.9 MDD (MAJOR DEPRESSIVE DISORDER), RECURRENT EPISODE (HCC): Status: ACTIVE | Noted: 2023-03-08

## 2023-06-22 ENCOUNTER — HOSPITAL ENCOUNTER (EMERGENCY)
Facility: HOSPITAL | Age: 23
Discharge: NURSING, REHAB OR LONG-TERM CARE | End: 2023-06-22
Attending: EMERGENCY MEDICINE
Payer: COMMERCIAL

## 2023-06-22 VITALS
SYSTOLIC BLOOD PRESSURE: 118 MMHG | OXYGEN SATURATION: 97 % | TEMPERATURE: 98 F | RESPIRATION RATE: 18 BRPM | DIASTOLIC BLOOD PRESSURE: 70 MMHG | BODY MASS INDEX: 23.32 KG/M2 | HEART RATE: 76 BPM | WEIGHT: 140 LBS | HEIGHT: 65 IN

## 2023-06-22 DIAGNOSIS — K59.00 CONSTIPATION, UNSPECIFIED CONSTIPATION TYPE: ICD-10-CM

## 2023-06-22 DIAGNOSIS — R45.851 SUICIDAL IDEATION: ICD-10-CM

## 2023-06-22 DIAGNOSIS — F11.93 OPIATE WITHDRAWAL (HCC): ICD-10-CM

## 2023-06-22 DIAGNOSIS — R10.13 DYSPEPSIA: ICD-10-CM

## 2023-06-22 DIAGNOSIS — R11.2 NAUSEA AND VOMITING IN ADULT: Primary | ICD-10-CM

## 2023-06-22 PROBLEM — F11.20 OPIATE DEPENDENCE (HCC): Status: ACTIVE | Noted: 2023-06-22

## 2023-06-22 LAB
ALBUMIN SERPL-MCNC: 4.1 G/DL (ref 3.4–5)
ALBUMIN/GLOB SERPL: 1 {RATIO} (ref 1–2)
ALP LIVER SERPL-CCNC: 87 U/L
ALT SERPL-CCNC: 70 U/L
AMPHET UR QL SCN: NEGATIVE
ANION GAP SERPL CALC-SCNC: 12 MMOL/L (ref 0–18)
AST SERPL-CCNC: 43 U/L (ref 15–37)
ATRIAL RATE: 88 BPM
B-HCG UR QL: NEGATIVE
BARBITURATES UR QL SCN: NEGATIVE
BASOPHILS # BLD AUTO: 0.02 X10(3) UL (ref 0–0.2)
BASOPHILS NFR BLD AUTO: 0.3 %
BENZODIAZ UR QL SCN: NEGATIVE
BILIRUB SERPL-MCNC: 0.9 MG/DL (ref 0.1–2)
BILIRUB UR QL: NEGATIVE
BUN BLD-MCNC: 10 MG/DL (ref 7–18)
BUN/CREAT SERPL: 16.4 (ref 10–20)
CALCIUM BLD-MCNC: 9.7 MG/DL (ref 8.5–10.1)
CANNABINOIDS UR QL SCN: NEGATIVE
CHLORIDE SERPL-SCNC: 108 MMOL/L (ref 98–112)
CO2 SERPL-SCNC: 19 MMOL/L (ref 21–32)
COLOR UR: YELLOW
CREAT BLD-MCNC: 0.61 MG/DL
CREAT UR-SCNC: 156 MG/DL
DEPRECATED RDW RBC AUTO: 45.3 FL (ref 35.1–46.3)
EOSINOPHIL # BLD AUTO: 0 X10(3) UL (ref 0–0.7)
EOSINOPHIL NFR BLD AUTO: 0 %
ERYTHROCYTE [DISTWIDTH] IN BLOOD BY AUTOMATED COUNT: 14 % (ref 11–15)
GFR SERPLBLD BASED ON 1.73 SQ M-ARVRAT: 129 ML/MIN/1.73M2 (ref 60–?)
GLOBULIN PLAS-MCNC: 4.2 G/DL (ref 2.8–4.4)
GLUCOSE BLD-MCNC: 99 MG/DL (ref 70–99)
GLUCOSE UR-MCNC: NORMAL MG/DL
HCT VFR BLD AUTO: 41.6 %
HGB BLD-MCNC: 14 G/DL
HGB UR QL STRIP.AUTO: NEGATIVE
IMM GRANULOCYTES # BLD AUTO: 0.02 X10(3) UL (ref 0–1)
IMM GRANULOCYTES NFR BLD: 0.3 %
KETONES UR-MCNC: >150 MG/DL
LEUKOCYTE ESTERASE UR QL STRIP.AUTO: NEGATIVE
LIPASE SERPL-CCNC: 21 U/L (ref 13–75)
LYMPHOCYTES # BLD AUTO: 1.66 X10(3) UL (ref 1–4)
LYMPHOCYTES NFR BLD AUTO: 24 %
MCH RBC QN AUTO: 29.3 PG (ref 26–34)
MCHC RBC AUTO-ENTMCNC: 33.7 G/DL (ref 31–37)
MCV RBC AUTO: 87 FL
METHADONE UR QL SCN: NEGATIVE
MONOCYTES # BLD AUTO: 0.26 X10(3) UL (ref 0.1–1)
MONOCYTES NFR BLD AUTO: 3.8 %
NEUTROPHILS # BLD AUTO: 4.97 X10 (3) UL (ref 1.5–7.7)
NEUTROPHILS # BLD AUTO: 4.97 X10(3) UL (ref 1.5–7.7)
NEUTROPHILS NFR BLD AUTO: 71.6 %
NITRITE UR QL STRIP.AUTO: NEGATIVE
OPIATES UR QL SCN: NEGATIVE
OSMOLALITY SERPL CALC.SUM OF ELEC: 287 MOSM/KG (ref 275–295)
OXYCODONE UR QL SCN: NEGATIVE
P AXIS: 73 DEGREES
P-R INTERVAL: 146 MS
PCP UR QL SCN: NEGATIVE
PH UR: 5.5 [PH] (ref 5–8)
PLATELET # BLD AUTO: 252 10(3)UL (ref 150–450)
POTASSIUM SERPL-SCNC: 3.4 MMOL/L (ref 3.5–5.1)
PROT SERPL-MCNC: 8.3 G/DL (ref 6.4–8.2)
PROT UR-MCNC: 50 MG/DL
Q-T INTERVAL: 388 MS
QRS DURATION: 98 MS
QTC CALCULATION (BEZET): 469 MS
R AXIS: 90 DEGREES
RBC # BLD AUTO: 4.78 X10(6)UL
SODIUM SERPL-SCNC: 139 MMOL/L (ref 136–145)
SP GR UR STRIP: >1.03 (ref 1–1.03)
T AXIS: 70 DEGREES
UROBILINOGEN UR STRIP-ACNC: NORMAL
VENTRICULAR RATE: 88 BPM
WBC # BLD AUTO: 6.9 X10(3) UL (ref 4–11)

## 2023-06-22 PROCEDURE — 80307 DRUG TEST PRSMV CHEM ANLYZR: CPT | Performed by: EMERGENCY MEDICINE

## 2023-06-22 PROCEDURE — 83690 ASSAY OF LIPASE: CPT | Performed by: EMERGENCY MEDICINE

## 2023-06-22 PROCEDURE — 81001 URINALYSIS AUTO W/SCOPE: CPT | Performed by: EMERGENCY MEDICINE

## 2023-06-22 PROCEDURE — 85025 COMPLETE CBC W/AUTO DIFF WBC: CPT | Performed by: EMERGENCY MEDICINE

## 2023-06-22 PROCEDURE — 93010 ELECTROCARDIOGRAM REPORT: CPT

## 2023-06-22 PROCEDURE — 93005 ELECTROCARDIOGRAM TRACING: CPT

## 2023-06-22 PROCEDURE — 96361 HYDRATE IV INFUSION ADD-ON: CPT

## 2023-06-22 PROCEDURE — 99285 EMERGENCY DEPT VISIT HI MDM: CPT

## 2023-06-22 PROCEDURE — C9113 INJ PANTOPRAZOLE SODIUM, VIA: HCPCS | Performed by: EMERGENCY MEDICINE

## 2023-06-22 PROCEDURE — 81025 URINE PREGNANCY TEST: CPT

## 2023-06-22 PROCEDURE — 96374 THER/PROPH/DIAG INJ IV PUSH: CPT

## 2023-06-22 PROCEDURE — 96375 TX/PRO/DX INJ NEW DRUG ADDON: CPT

## 2023-06-22 PROCEDURE — 80053 COMPREHEN METABOLIC PANEL: CPT | Performed by: EMERGENCY MEDICINE

## 2023-06-22 RX ORDER — METOCLOPRAMIDE HYDROCHLORIDE 5 MG/ML
10 INJECTION INTRAMUSCULAR; INTRAVENOUS ONCE
Status: COMPLETED | OUTPATIENT
Start: 2023-06-22 | End: 2023-06-22

## 2023-06-22 RX ORDER — ONDANSETRON 2 MG/ML
4 INJECTION INTRAMUSCULAR; INTRAVENOUS ONCE
Status: COMPLETED | OUTPATIENT
Start: 2023-06-22 | End: 2023-06-22

## 2023-06-22 RX ORDER — PROCHLORPERAZINE MALEATE 10 MG
10 TABLET ORAL EVERY 6 HOURS PRN
Qty: 10 TABLET | Refills: 0 | Status: SHIPPED | OUTPATIENT
Start: 2023-06-22 | End: 2023-07-03

## 2023-06-22 RX ORDER — POLYETHYLENE GLYCOL 3350 17 G/17G
17 POWDER, FOR SOLUTION ORAL DAILY PRN
Qty: 12 EACH | Refills: 0 | Status: SHIPPED | OUTPATIENT
Start: 2023-06-22 | End: 2023-07-03

## 2023-06-22 RX ORDER — FAMOTIDINE 20 MG/1
20 TABLET, FILM COATED ORAL DAILY
Qty: 7 TABLET | Refills: 0 | Status: SHIPPED | OUTPATIENT
Start: 2023-06-22 | End: 2023-07-03

## 2023-06-22 RX ORDER — BUPRENORPHINE HYDROCHLORIDE 8 MG/1
8 TABLET SUBLINGUAL ONCE
Status: COMPLETED | OUTPATIENT
Start: 2023-06-22 | End: 2023-06-22

## 2023-06-22 RX ORDER — DIPHENHYDRAMINE HYDROCHLORIDE 50 MG/ML
50 INJECTION INTRAMUSCULAR; INTRAVENOUS ONCE
Status: COMPLETED | OUTPATIENT
Start: 2023-06-22 | End: 2023-06-22

## 2023-06-22 RX ORDER — BISACODYL 10 MG
10 SUPPOSITORY, RECTAL RECTAL DAILY
Qty: 6 SUPPOSITORY | Refills: 0 | Status: SHIPPED | OUTPATIENT
Start: 2023-06-22 | End: 2023-07-03

## 2023-06-22 NOTE — BH PROGRESS NOTE
Dr. Blair Strickland has accepted pt to [facility]. Writer gave SBAR to [place] and provided pt's RN with N2N # to call for report and set up transport.

## 2023-06-22 NOTE — ED QUICK NOTES
Upon assessment, patient states \"Im sorry I lied to you earlier, I do feel like I want to die\". \"Pt states at times she wishes she could go to sleep and not wake up\". MD at bedside for for eval after patient c/o \"throat closing\". Meds given per MD order. Pt appears in no acute respiratory distress. No voice changes noted. VSS. Pt states \"maybe this is anxiety\".

## 2023-06-22 NOTE — ED PROVIDER NOTES
Patient endorsed to me by Dr. Ирина Wood for continuation of care. Patient is awaiting inpatient psychiatric transfer for suicidal ideation and has been calm throughout my shift. Patient accepted at [facility].
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
## 2023-06-22 NOTE — ED QUICK NOTES
Pt to ED c/o N/V after she forgot to take her suboxone for a few days. Pt reports she is unable to tolerate medications due to nausea. Pt reports using crack cocaine in the last few days. Denies SI/HI.

## 2023-06-23 PROBLEM — F33.9 MDD (MAJOR DEPRESSIVE DISORDER), RECURRENT EPISODE (HCC): Status: RESOLVED | Noted: 2023-03-08 | Resolved: 2023-06-23

## 2025-03-20 NOTE — LETTER
Date & Time: 9/3/2020, 1:12 PM  Patient: Yoselin Navagladys    Dear Yoselin Menchaca,    We are unable to reach you by phone and would like to follow up with you regarding your visit to the Emergency Department.         Please contact the Emergency Depa No

## 2025-05-20 NOTE — ED NOTES
Caller:  faisal       Regarding medication:  hydroCHLOROthiazide (HYDRODIURIL) 25 MG tablet     Reported Issue:  Causing adverse affects  Dehydration  Severe muscle cramping  Said pharmacist recommended something like generic maxcide to retain potassium instead    Call pt to advise new care plan            Caller confirms readback of documented phone/fax number(s) as correct.      Appointments:  Last seen at [facility]:   2/12/2024   Future appointment [facility]:  7/24/2025    Pt unable to provide urine sample after multiple attempts.

## (undated) NOTE — MR AVS SNAPSHOT
[address] [facility]  [phone]               Thank you for choosing us for your health care visit with SHEREEN Franco.   We are glad to serve you and happy to provide you with this summa 103.9, or if resolves then returns at end of illness. Concerns regarding duration of cough or difficulty breathing or if ear pain arises. In general follow up if symptoms worsen, do not improve, or concerns arise.     Call at any time with questions or c [email or web address]

## (undated) NOTE — Clinical Note
VACCINE ADMINISTRATION RECORD  PARENT / GUARDIAN APPROVAL  Date: 3/28/2017  Vaccine administered to: Eligio Lopes     : 2000    MRN: [id number]    A copy of the appropriate Centers for Disease Control and Prevention Vaccine Information statem

## (undated) NOTE — Clinical Note
4/21/2017    To Whom It May Concern:    Gina Gibson is currently under my medical care and was seen in the office today, 4/21/2017. Please excuse Neida Jimenez from any activities she may have missed.  She may return to school at this time without restrict

## (undated) NOTE — MR AVS SNAPSHOT
[facility] SPECIALTY [facility] [place] [address] [address]  [phone]               Thank you for choosing us for your health care visit with Reggie Rodriguez MD.  We are glad to serve you and happy to provide you with this summary of visit, view other health information and more. To sign up or find more information on getting   Proxy Access to your child’s MyChart go to [email or web address] and click on the   Sign Up Forms link in the Additional Information box on the right.

## (undated) NOTE — Clinical Note
[facility] [employer] of Child Health Examination       Student's Name  Clarence Madrid Title                           Date    (If adding dates to the above immunization history section, put your initials by date(s) and sign here.)   ALTERNATIVE PROOF OF IMMUNITY   1 Diagnosis of asthma? Child wakes during the night coughing   Yes       No    Yes       No    Loss of function of one of paired organs? (eye/ear/kidney/testicle)   Yes       No      Birth Defects? Developmental delay?    Yes       No    Yes       No  Hospi Family History   yes                 Ethnic Minority   no                          Signs of Insulin Resistance (hypertension, dyslipidemia, polycystic ovarian syndrome, acanthosis nigricans)     no                      At Risk    no   Lead Risk Javi Sandhu Controller medication (e.g. inhaled corticosteroid):   No Other   NEEDS/MODIFICATIONS required in the school setting  None DIETARY Needs/Restrictions     None   SPECIAL INSTRUCTIONS/DEVICES e.g. safety glasses, glass eye, chest protector for arrhyt

## (undated) NOTE — MR AVS SNAPSHOT
[address] [facility]  [phone]               Thank you for choosing us for your health care visit with SHEREEN Sanches.   We are glad to serve you and happy to provide you with this summa - Meningococcal (Menactra, Menveo) (10748) (DX V03.89)  - HEPATITIS A VACCINE,PEDIATRIC  - GARDASIL 9    2. Exercise counseling      3. Encounter for dietary counseling and surveillance      4. Weight loss  1 will call you with lab results when known.    - smoking, and sex. Talk openly about these issues. Answer your child’s questions, and don’t be afraid to ask questions of your own.  If you’re not sure how to approach these topics, talk to the healthcare provider for advice.   Puberty  Your teen may still b If your teen has a TV, computer, or video game console in the bedroom, consider replacing it with a music player.   · Eat healthy. Your child should eat fruits, vegetables, lean meats, and whole grains every day.  Less healthy foods—like [employer] fries, candy Sleeping is easier when the body follows a routine. Don’t let your teen stay up too late at night or sleep in too long in the morning. · Help your teen wake up, if needed.  Go into the bedroom, open the blinds, and get your teen out of bed — even on weeken other risky behaviors. Work together to come up with strategies for staying safe and dealing with peer pressure. Make sure your teenager knows he or she can always come to you for help.   Tests and vaccinations  If you have a strong family history of high c 125/76 mmHg (88 %, Z = 1.19 / 80 %, Z = 0.84*) 103    Height Weight    5' 5\" (1.651 m) (63 %*, Z = 0.34) 53. 524 kg (118 lb) (43 %*, Z = -0.17)    BMI    19.64 kg/m2 (34 %*, Z = -0.42)    *Growth percentiles are based on CDC 2-20 Years data     BP percent o 2 or less hours of screen time a day  o 1 or more hours of physical activity a day    To help children live healthy active lives, parents can:  o Be role models themselves by making healthy eating and daily physical activity the norm for their family.   o